# Patient Record
Sex: MALE | Race: OTHER | NOT HISPANIC OR LATINO | Employment: UNEMPLOYED | URBAN - METROPOLITAN AREA
[De-identification: names, ages, dates, MRNs, and addresses within clinical notes are randomized per-mention and may not be internally consistent; named-entity substitution may affect disease eponyms.]

---

## 2023-11-20 ENCOUNTER — HOSPITAL ENCOUNTER (EMERGENCY)
Facility: HOSPITAL | Age: 23
Discharge: HOME | End: 2023-11-20
Attending: EMERGENCY MEDICINE

## 2023-11-20 VITALS
WEIGHT: 117.5 LBS | SYSTOLIC BLOOD PRESSURE: 121 MMHG | TEMPERATURE: 98.3 F | HEART RATE: 69 BPM | OXYGEN SATURATION: 98 % | DIASTOLIC BLOOD PRESSURE: 69 MMHG | BODY MASS INDEX: 18.88 KG/M2 | RESPIRATION RATE: 16 BRPM | HEIGHT: 66 IN

## 2023-11-20 DIAGNOSIS — K64.9 HEMORRHOIDS: Primary | ICD-10-CM

## 2023-11-20 PROCEDURE — 99282 EMERGENCY DEPT VISIT SF MDM: CPT

## 2023-11-20 PROCEDURE — 99283 EMERGENCY DEPT VISIT LOW MDM: CPT | Performed by: PHYSICIAN ASSISTANT

## 2023-11-20 RX ORDER — HYDROCORTISONE 25 MG/G
CREAM TOPICAL 2 TIMES DAILY
Qty: 28 G | Refills: 0 | Status: SHIPPED | OUTPATIENT
Start: 2023-11-20

## 2023-11-20 NOTE — ED PROVIDER NOTES
History  Chief Complaint   Patient presents with    Hemorrhoids     Reports hx of hemorrhoids and states he has them at this time but they have become very painful. Using cream at home with no relief.      + Hemorid for months -feels its getting worse - using cream -   Taking myralax daily - normal - soft stools. No abdominal pain  Has seen blood from area - is more painful. None       Past Medical History:   Diagnosis Date    Hemorrhoid        History reviewed. No pertinent surgical history. History reviewed. No pertinent family history. I have reviewed and agree with the history as documented. E-Cigarette/Vaping     E-Cigarette/Vaping Substances     Social History     Tobacco Use    Smoking status: Never    Smokeless tobacco: Never   Substance Use Topics    Alcohol use: Never    Drug use: Never       Review of Systems   Constitutional:  Negative for fever. Respiratory: Negative. Cardiovascular: Negative. Gastrointestinal:  Positive for rectal pain. Negative for abdominal distention, abdominal pain, constipation, diarrhea, nausea and vomiting. Genitourinary: Negative. All other systems reviewed and are negative. Physical Exam  Physical Exam  Vitals and nursing note reviewed. Exam conducted with a chaperone present. Constitutional:       Appearance: He is well-developed. HENT:      Head: Normocephalic and atraumatic. Right Ear: Tympanic membrane, ear canal and external ear normal.      Left Ear: Tympanic membrane, ear canal and external ear normal.   Eyes:      Conjunctiva/sclera: Conjunctivae normal.   Cardiovascular:      Rate and Rhythm: Normal rate and regular rhythm. Heart sounds: Normal heart sounds. Pulmonary:      Effort: Pulmonary effort is normal.      Breath sounds: Normal breath sounds. Abdominal:      General: Bowel sounds are normal.      Palpations: Abdomen is soft. Genitourinary:     Rectum: Guaiac result negative.       Comments: Small external Hemorid not thrombosed   Musculoskeletal:         General: Normal range of motion. Cervical back: Normal range of motion and neck supple. Lymphadenopathy:      Cervical: No cervical adenopathy. Skin:     General: Skin is warm. Findings: No rash. Neurological:      Mental Status: He is alert and oriented to person, place, and time. Motor: No abnormal muscle tone. Coordination: Coordination normal.   Psychiatric:         Mood and Affect: Mood normal.         Behavior: Behavior normal.         Vital Signs  ED Triage Vitals [11/20/23 1127]   Temperature Pulse Respirations Blood Pressure SpO2   98.3 °F (36.8 °C) 69 16 121/69 98 %      Temp Source Heart Rate Source Patient Position - Orthostatic VS BP Location FiO2 (%)   Oral Monitor Sitting Right arm --      Pain Score       --           Vitals:    11/20/23 1127   BP: 121/69   Pulse: 69   Patient Position - Orthostatic VS: Sitting         Visual Acuity      ED Medications  Medications - No data to display    Diagnostic Studies  Results Reviewed       None                   No orders to display              Procedures  Procedures         ED Course                                             Medical Decision Making  Risk  Prescription drug management. Disposition  Final diagnoses:   Hemorrhoids     Time reflects when diagnosis was documented in both MDM as applicable and the Disposition within this note       Time User Action Codes Description Comment    11/20/2023  1:07 PM Clau Bui Add [K64.9] Hemorrhoids           ED Disposition       ED Disposition   Discharge    Condition   Stable    Date/Time   Mon Nov 20, 2023  1:07 PM    Comment   Blaine Morocho discharge to home/self care.                    Follow-up Information       Follow up With Specialties Details Why Contact Info Additional Information    [facility]   [address] [facility] [address]  [address] [facility], [phone]            Patient's Medications   Discharge Prescriptions    HYDROCORTISONE (ANUSOL-HC) 2.5 % RECTAL CREAM    Apply topically 2 (two) times a day       Start Date: 11/20/2023End Date: --       Order Dose: --       Quantity: 28 g    Refills: 0           PDMP Review       None            ED Provider  Electronically Signed by             Saúl Haq PA-C  11/20/23 2101

## 2023-11-24 NOTE — PROGRESS NOTES
Diagnoses and all orders for this visit:    Hemorrhoids  -     Ambulatory Referral to Colorectal Surgery       Hemorrhoids  Patient presents for evaluation of anorectal pain. Patient notes that for the past 7 months he has been treated for anorectal pain. He has been treated with high-fiber diet and avoidance of irritants, as well as rubber band ligation. This gave him some temporary relief but this is since recurred. He now notes that he has constant pain that is worse with bowel movements. Examination shows mild skin redundancy in the left lateral position. He does have prolapsing internal hemorrhoids. No signs of anal fissure or infection. He does have significant discomfort with anal rectal examination but tolerates anoscopy. From an anatomic point of view patient certainly does have prolapsing internal and external hemorrhoids. These can be best treated given his symptoms of prolapse and rectal bleeding with excisional hemorrhoidectomy. It is unclear if all of his pain is due to prolapsing hemorrhoids. We discussed that he may have some discomfort of the perianal skin which will not necessarily get better with hemorrhoid surgery. We also discussed that the time of examination in the operating room if other lesions are seen of concerned they can be addressed as well. Plan will be OR for excisional hemorrhoidectomy. OR for Colectomy. Risks of surgery, including but not limited to bleeding, infection, anastomotic leak, need for colostomy or enterostomy, injury or dysfunction of the bowel or urinary tract, injury or need for removal of other organs, sexual dysfunction, impotence, bowel obstruction in the future, hernia formation, bowel dysfunction, fecal incontinence, thromboembolic complications (deep vein clots or clots to the lungs), heart failure, heart attack, even death were reviewed. Questions were fully answered.       LIEN Hdez is here referred by REYNA Wood for hemorrhoids. The patient notes he has been using Preparation H suppositories and Hydrocortisone cream.     The patient was seen in the emergency room on 11/20/2023 for rectal pain, hemorrhoids. Exam showed:  Small external Hemorid not thrombosed. Patient notes last colonoscopy in 2019 for diarrhea, in [place], and as per his recollection it was within normal limits. Past Medical History:   Diagnosis Date    Hemorrhoid      No past surgical history on file. Current Outpatient Medications:     hydrocortisone (ANUSOL-HC) 2.5 % rectal cream, Apply topically 2 (two) times a day, Disp: 28 g, Rfl: 0    polyethylene glycol (GLYCOLAX) 17 GM/SCOOP powder, Take 17 g by mouth daily Miralax, Disp: , Rfl:   Allergies as of 11/29/2023    (No Known Allergies)     Review of Systems   Gastrointestinal:  Positive for anal bleeding and rectal pain. All other systems reviewed and are negative. Vitals:    11/29/23 1354   BP: 116/68     Physical Exam  Constitutional:       Appearance: Normal appearance. HENT:      Head: Normocephalic and atraumatic. Eyes:      Extraocular Movements: Extraocular movements intact. Pupils: Pupils are equal, round, and reactive to light. Musculoskeletal:         General: Normal range of motion. Skin:     General: Skin is warm and dry. Neurological:      General: No focal deficit present. Mental Status: He is alert and oriented to person, place, and time. Psychiatric:         Mood and Affect: Mood normal.         Behavior: Behavior normal.         Thought Content: Thought content normal.         Judgment: Judgment normal.     Lower Endoscopy    Date/Time: 11/29/2023 2:00 PM    Performed by: Tenny Rubinstein, MD  Authorized by: Tenny Rubinstein, MD    Verbal consent obtained?: Yes    Risks and benefits: Risks, benefits and alternatives were discussed    Consent given by:  Patient  Indications: rectal bleeding    Scope type: Anoscope  External exam performed:  Yes Pilonidal sinus tract: No    Pilonidal cyst: No    Pilonidal tenderness: No    Perianal skin tags: Yes    Perirectal warts: No    Perianal maceration: No    Perianal induration: No    Perianal erythema: No    External hemorrhoids: No    Digital exam performed: Yes    Laxity of anal sphincter: No    Prostate tenderness: No    Internal hemorrhoids: Yes    Prolapsed: Yes    Intraluminal mass: No    Inflammation: No    Anal fissures: No    Anal fistulae: No    Anal stricture: No    Abscess: No    Procedure termination:  Procedure complete  Patient tolerance:  Patient tolerated the procedure well with no immediate complications

## 2023-11-29 ENCOUNTER — OFFICE VISIT (OUTPATIENT)
Age: 23
End: 2023-11-29

## 2023-11-29 VITALS
BODY MASS INDEX: 19.13 KG/M2 | DIASTOLIC BLOOD PRESSURE: 68 MMHG | HEIGHT: 66 IN | SYSTOLIC BLOOD PRESSURE: 116 MMHG | WEIGHT: 119 LBS

## 2023-11-29 DIAGNOSIS — K64.9 HEMORRHOIDS: Primary | ICD-10-CM

## 2023-11-29 PROCEDURE — 46600 DIAGNOSTIC ANOSCOPY SPX: CPT | Performed by: COLON & RECTAL SURGERY

## 2023-11-29 PROCEDURE — 99243 OFF/OP CNSLTJ NEW/EST LOW 30: CPT | Performed by: COLON & RECTAL SURGERY

## 2023-11-29 RX ORDER — POLYETHYLENE GLYCOL 3350 17 G/17G
17 POWDER, FOR SOLUTION ORAL DAILY
COMMUNITY

## 2023-11-29 NOTE — Clinical Note
Please call patient to schedule hemorrhoidectomy. He does not have insurance and is interested in assistance/price quotes.

## 2023-11-29 NOTE — ASSESSMENT & PLAN NOTE
Patient presents for evaluation of anorectal pain. Patient notes that for the past 7 months he has been treated for anorectal pain. He has been treated with high-fiber diet and avoidance of irritants, as well as rubber band ligation. This gave him some temporary relief but this is since recurred. He now notes that he has constant pain that is worse with bowel movements. Examination shows mild skin redundancy in the left lateral position. He does have prolapsing internal hemorrhoids. No signs of anal fissure or infection. He does have significant discomfort with anal rectal examination but tolerates anoscopy. From an anatomic point of view patient certainly does have prolapsing internal and external hemorrhoids. These can be best treated given his symptoms of prolapse and rectal bleeding with excisional hemorrhoidectomy. It is unclear if all of his pain is due to prolapsing hemorrhoids. We discussed that he may have some discomfort of the perianal skin which will not necessarily get better with hemorrhoid surgery. We also discussed that the time of examination in the operating room if other lesions are seen of concerned they can be addressed as well. Plan will be OR for excisional hemorrhoidectomy. OR for Colectomy. Risks of surgery, including but not limited to bleeding, infection, anastomotic leak, need for colostomy or enterostomy, injury or dysfunction of the bowel or urinary tract, injury or need for removal of other organs, sexual dysfunction, impotence, bowel obstruction in the future, hernia formation, bowel dysfunction, fecal incontinence, thromboembolic complications (deep vein clots or clots to the lungs), heart failure, heart attack, even death were reviewed. Questions were fully answered.

## 2023-11-30 ENCOUNTER — TELEPHONE (OUTPATIENT)
Age: 23
End: 2023-11-30

## 2023-11-30 NOTE — LETTER
[facility]  [address] [address]        ------------------------------------------------------------------------------------------------------------  11/30/2023    Dear Ragini Sanchez,    Your surgery is scheduled for: 2/2/2024  The hospital will call the evening prior to your surgery with your expected arrival time. Location:[place] [address]    CHECK LIST PRIOR TO OUTPATIENT SURGERY  It is your responsibility to obtain any/all referrals needed for your surgery if required by your insurance. Our office will contact you to discuss your insurance coverage for this procedure. Special instructions required if you are taking any blood thinners. Please verify with the prescribing physician. Examples include Coumadin, Plavix, Xarelto, Eliquis, Pradaxa, etc.     Please check with your family physician if you are taking the following medications: Aspirin or any Aspirin containing medication, Gingko biloba, Ginseng, Feverfew, and/or Eolia’s Wort. We suggest stopping these for 3 days. The night before and the day of your surgery, wash from your neck to groin with chlorhexidine soap. This soap is available at most retail pharmacies under such brand names as Hibiclens, Endure or Aplicare. Pre-admission testing Required: YES      NO x        Other Clearances/ Additional Testing: NONE     NOTHING TO EAT OR DRINK AFTER MIDNIGHT PRIOR TO SURGERY. Take ONE FLEET ENEMA one hour prior to leaving for the hospital.     Please do not hesitate to call our office with any questions regarding your surgery. Post-Operative Care Information   Hemorrhoidectomy, EUA, Fissurectomy, Fistulotomy, Sphincterotomy      Resume high fiber diet. Fiber supplementation with Metamucil or Konsyl also recommended daily. Your first bowel movement may take up to 3 days after surgery. THIS IS OFTEN PAINFUL.       While taking narcotic pain medication, you should remain on an over-the-counter stool softener such as Colace (one tablet twice daily). For constipation Miralax can be taken up to three times daily. Pain is to be expected after hemorrhoid surgery. Ibuprofen (400? 600MG) every 6?8 hours is an excellent alternative in addition or as a substitute to your narcotic pain medication. Rectal bleeding or drainage is normal after surgery. Nausea is a common complaint post op. This can be associated with the narcotic pain medications, anesthesia as well as with severe constipation. Driving may be resumed when all off all narcotic pain medications and you can turn or twist your body without hesitation. If you are unable to urinate within 8 hours after surgery, please call the office at [phone]    Frequent warm tub soaks or warm showers are often soothing, we suggest 3 to 4 times daily. After bowel movements, you may wash with a hand shower or warm tub soak. No soap is okay. No strenuous activity (i.e., Running, jogging, swimming, or weightlifting) for up to one week after surgery. When will I receive follow-up care? You should be scheduled for a post-op appointment within 6-8 weeks after surgery, unless otherwise instructed on your discharge summary. If you do not have an existing appointment at the time of discharge, please call our office at [phone]. Call the office at [phone] immediately if you have a fever, chills, excessive sweating, difficulty urinating, or excessive/profuse bleeding with clots.

## 2023-11-30 NOTE — TELEPHONE ENCOUNTER
----- Message from Kendall Taylor MD sent at 11/29/2023  2:29 PM EST -----  Please call patient to schedule hemorrhoidectomy. He does not have insurance and is interested in assistance/price quotes.

## 2023-11-30 NOTE — TELEPHONE ENCOUNTER
Patient scheduled for surgery 2/2/2024  at [facility] . Instructions and PAT's gone over with and mailed to the patient.

## 2023-12-14 ENCOUNTER — TELEPHONE (OUTPATIENT)
Age: 23
End: 2023-12-14